# Patient Record
Sex: FEMALE | Race: WHITE
[De-identification: names, ages, dates, MRNs, and addresses within clinical notes are randomized per-mention and may not be internally consistent; named-entity substitution may affect disease eponyms.]

---

## 2019-10-09 ENCOUNTER — HOSPITAL ENCOUNTER (INPATIENT)
Dept: HOSPITAL 61 - ER | Age: 74
LOS: 1 days | Discharge: HOME | DRG: 392 | End: 2019-10-10
Attending: FAMILY MEDICINE | Admitting: FAMILY MEDICINE
Payer: MEDICARE

## 2019-10-09 VITALS — WEIGHT: 277.44 LBS | HEIGHT: 65 IN | BODY MASS INDEX: 46.22 KG/M2

## 2019-10-09 VITALS — DIASTOLIC BLOOD PRESSURE: 95 MMHG | SYSTOLIC BLOOD PRESSURE: 121 MMHG

## 2019-10-09 VITALS — DIASTOLIC BLOOD PRESSURE: 76 MMHG | SYSTOLIC BLOOD PRESSURE: 156 MMHG

## 2019-10-09 VITALS — SYSTOLIC BLOOD PRESSURE: 120 MMHG | DIASTOLIC BLOOD PRESSURE: 59 MMHG

## 2019-10-09 DIAGNOSIS — E03.9: ICD-10-CM

## 2019-10-09 DIAGNOSIS — I87.2: ICD-10-CM

## 2019-10-09 DIAGNOSIS — I50.32: ICD-10-CM

## 2019-10-09 DIAGNOSIS — E11.9: ICD-10-CM

## 2019-10-09 DIAGNOSIS — I11.0: ICD-10-CM

## 2019-10-09 DIAGNOSIS — Z98.49: ICD-10-CM

## 2019-10-09 DIAGNOSIS — E88.81: ICD-10-CM

## 2019-10-09 DIAGNOSIS — Z96.653: ICD-10-CM

## 2019-10-09 DIAGNOSIS — M19.90: ICD-10-CM

## 2019-10-09 DIAGNOSIS — E66.01: ICD-10-CM

## 2019-10-09 DIAGNOSIS — L97.329: ICD-10-CM

## 2019-10-09 DIAGNOSIS — I45.10: ICD-10-CM

## 2019-10-09 DIAGNOSIS — K21.9: Primary | ICD-10-CM

## 2019-10-09 DIAGNOSIS — Z82.49: ICD-10-CM

## 2019-10-09 DIAGNOSIS — E78.5: ICD-10-CM

## 2019-10-09 LAB
ALBUMIN SERPL-MCNC: 4.1 G/DL (ref 3.4–5)
ALBUMIN/GLOB SERPL: 1.2 {RATIO} (ref 1–1.7)
ALP SERPL-CCNC: 97 U/L (ref 46–116)
ALT SERPL-CCNC: 25 U/L (ref 14–59)
ANION GAP SERPL CALC-SCNC: 8 MMOL/L (ref 6–14)
AST SERPL-CCNC: 26 U/L (ref 15–37)
BASOPHILS # BLD AUTO: 0.1 X10^3/UL (ref 0–0.2)
BASOPHILS NFR BLD: 1 % (ref 0–3)
BILIRUB SERPL-MCNC: 0.4 MG/DL (ref 0.2–1)
BUN SERPL-MCNC: 20 MG/DL (ref 7–20)
BUN/CREAT SERPL: 25 (ref 6–20)
CALCIUM SERPL-MCNC: 9.8 MG/DL (ref 8.5–10.1)
CHLORIDE SERPL-SCNC: 102 MMOL/L (ref 98–107)
CK SERPL-CCNC: 92 U/L (ref 26–192)
CO2 SERPL-SCNC: 32 MMOL/L (ref 21–32)
CREAT SERPL-MCNC: 0.8 MG/DL (ref 0.6–1)
EOSINOPHIL NFR BLD: 0.1 X10^3/UL (ref 0–0.7)
EOSINOPHIL NFR BLD: 1 % (ref 0–3)
ERYTHROCYTE [DISTWIDTH] IN BLOOD BY AUTOMATED COUNT: 14.2 % (ref 11.5–14.5)
GFR SERPLBLD BASED ON 1.73 SQ M-ARVRAT: 69.9 ML/MIN
GLOBULIN SER-MCNC: 3.5 G/DL (ref 2.2–3.8)
GLUCOSE SERPL-MCNC: 106 MG/DL (ref 70–99)
HCT VFR BLD CALC: 44.5 % (ref 36–47)
HGB BLD-MCNC: 14.9 G/DL (ref 12–15.5)
LIPASE: 233 U/L (ref 73–393)
LYMPHOCYTES # BLD: 1.8 X10^3/UL (ref 1–4.8)
LYMPHOCYTES NFR BLD AUTO: 34 % (ref 24–48)
MAGNESIUM SERPL-MCNC: 1.8 MG/DL (ref 1.8–2.4)
MCH RBC QN AUTO: 31 PG (ref 25–35)
MCHC RBC AUTO-ENTMCNC: 33 G/DL (ref 31–37)
MCV RBC AUTO: 92 FL (ref 79–100)
MONO #: 0.5 X10^3/UL (ref 0–1.1)
MONOCYTES NFR BLD: 10 % (ref 0–9)
NEUT #: 2.8 X10^3/UL (ref 1.8–7.7)
NEUTROPHILS NFR BLD AUTO: 53 % (ref 31–73)
PLATELET # BLD AUTO: 199 X10^3/UL (ref 140–400)
POTASSIUM SERPL-SCNC: 3.7 MMOL/L (ref 3.5–5.1)
PROT SERPL-MCNC: 7.6 G/DL (ref 6.4–8.2)
PROTHROMBIN TIME: 12.6 SEC (ref 11.7–14)
RBC # BLD AUTO: 4.85 X10^6/UL (ref 3.5–5.4)
SODIUM SERPL-SCNC: 142 MMOL/L (ref 136–145)
WBC # BLD AUTO: 5.2 X10^3/UL (ref 4–11)

## 2019-10-09 PROCEDURE — 82550 ASSAY OF CK (CPK): CPT

## 2019-10-09 PROCEDURE — 83690 ASSAY OF LIPASE: CPT

## 2019-10-09 PROCEDURE — 83735 ASSAY OF MAGNESIUM: CPT

## 2019-10-09 PROCEDURE — 71045 X-RAY EXAM CHEST 1 VIEW: CPT

## 2019-10-09 PROCEDURE — 85610 PROTHROMBIN TIME: CPT

## 2019-10-09 PROCEDURE — 84484 ASSAY OF TROPONIN QUANT: CPT

## 2019-10-09 PROCEDURE — 93306 TTE W/DOPPLER COMPLETE: CPT

## 2019-10-09 PROCEDURE — 80053 COMPREHEN METABOLIC PANEL: CPT

## 2019-10-09 PROCEDURE — 85025 COMPLETE CBC W/AUTO DIFF WBC: CPT

## 2019-10-09 PROCEDURE — 93005 ELECTROCARDIOGRAM TRACING: CPT

## 2019-10-09 PROCEDURE — 84443 ASSAY THYROID STIM HORMONE: CPT

## 2019-10-09 PROCEDURE — 36415 COLL VENOUS BLD VENIPUNCTURE: CPT

## 2019-10-09 PROCEDURE — G0378 HOSPITAL OBSERVATION PER HR: HCPCS

## 2019-10-09 PROCEDURE — 80061 LIPID PANEL: CPT

## 2019-10-09 PROCEDURE — 83880 ASSAY OF NATRIURETIC PEPTIDE: CPT

## 2019-10-09 RX ADMIN — ENOXAPARIN SODIUM SCH MG: 40 INJECTION SUBCUTANEOUS at 17:00

## 2019-10-09 NOTE — RAD
PORTABLE CHEST 1V 10/9/2019 2:42 PM

 

INDICATION: Chest pain

 

COMPARISON: 8/12/2016

 

TECHNIQUE: Portable frontal view of the chest is provided.

 

FINDINGS:

 

The cardiomediastinal silhouette is similar in appearance. Lungs are 

clear.

 

There are no significant pleural effusions. There is no pulmonary vascular

congestion. No pneumothorax.

 

IMPRESSION:

 

There is no acute cardiopulmonary process.

 

Electronically signed by: Angela Castorena MD (10/9/2019 3:12 PM) 

[facility]

## 2019-10-09 NOTE — PDOC1
History and Physical


Date of Admission


Date of Admission


DATE: 10/9/19 


TIME: 15:44





Identification/Chief Complaint


Chief Complaint


seen in ER WITH EPISODE OF CHEST TIGHTNESS THIS AM WHILE DRIVING with 





Past Medical History


Past Medical History





Past Medical History


Cardiovascular:  HTN, Hyperlipidemia


Pulmonary:  No pertinent hx


GI:  No pertinent hx


Heme/Onc:  No pertinent hx


Hepatobiliary:  No pertinent hx


Psych:  No pertinent hx


Musculoskeletal:  Osteoarthritis


Rheumatologic:  No pertinent hx


Infectious disease:  No pertinent hx


ENT:  No pertinent hx


Renal/:  No pertinent hx


Endocrine:  Diabetes, Hypothyroidism


Dermatology:  No pertinent hx





Past Surgical History


Past Surgical History:  Total knee replacement, Other (Left lower extremity 

revascularization)





Family History


Family History:  No Significant





Social History


Smoke:  No


ALCOHOL:  NIGHTLY ONE COCTAIL


Drugs:  None


Cardiovascular:  HTN, Hyperlipidemia


Pulmonary:  No pertinent hx


GI:  No pertinent hx


Heme/Onc:  No pertinent hx


Hepatobiliary:  No pertinent hx


Psych:  No pertinent hx


Musculoskeletal:  Osteoarthritis


Rheumatologic:  No pertinent hx


Infectious disease:  No pertinent hx


Renal/:  No pertinent hx


Endocrine:  Diabetes, Hypothyroidism





Past Surgical History


Past Surgical History:  Total knee replacement, Other





Family History


Family History:  No Significant, High Cholestrol, Hypertension





Social History


ALCOHOL:  none


Drugs:  None





Current Medications


Current Medications





Current Medications


Aspirin (Children'S Aspirin) 324 mg 1X  ONCE PO  Last administered on 10/9/19at 

14:53;  Start 10/9/19 at 14:15;  Stop 10/9/19 at 14:18;  Status DC


Nitroglycerin (Nitrostat) 0.4 mg PRN Q5MIN  PRN SL CP RATING > 1/10 Last 

administered on 10/9/19at 14:55;  Start 10/9/19 at 14:15;  Stop 10/10/19 at 

14:14





Active Scripts


Active


Reported


Lisinopril 40 Mg Tablet 40 Mg PO DAILY


Metformin Hcl 500 Mg Tablet 500 Mg PO DAILY


Levothyroxine Sodium 50 Mcg Tablet 50 Mcg PO DAILY


Lovastatin 10 Mg Tablet 10 Mg PO DAILY


Meloxicam 7.5 Mg Tablet 7.5 Mg PO DAILY





Allergies


Allergies:  


Coded Allergies:  


     No Known Drug Allergies (Unverified , 9/6/15)





ROS


Review of System


14 pt ros otherwise nl


General:  YES: Fatigue


PSYCHOLOGICAL ROS:  No: Anxiety, Behavioral Disorder, Concentration difficultie,

Decreased libido, Depression, Disorientation, Hallucinations, Hostility, 

Irritablity, Memory difficulties, Mood Swings, Obsessive thoughts, Physical 

abuse, Sexual abuse, Sleep disturbances, Suicidal ideation, Other


ALLERGY AND IMMUNOLOGY:  No: Hives, Insect Bite Sensitivity, Itchy/Watery Eyes, 

Nasal Congestion, Post Nasal Drip, Seasonal Allergies, Other


Hematological and Lymphatic:  No: Bleeding Problems, Blood Clots, Blood 

Transfusions, Brusing, Night Sweats, Pallor, Swollen Lymph Nodes, Other


Cardiovascular:  yes Chest Pain


Gastrointestinal:  No Nausea, No Vomiting, No Abdominal Pain, No Diarrhea, No 

Constipation, No Melena, No Hematochezia, No Other


Musculoskeletal:  Yes Joint Stiffness; 


   No Gait Disturbance, No Joint Pain, No Joint Swelling, No Muscle Pain, No 

Muscular Weakness, No Pain In:, No Swelling In:, No Other


Neurological:  No Behavorial Changes, No Bowel/Bladder ControlChng, No 

Confusion, No Dizziness, No Gait Disturbance, No Headaches, No Impaired 

Coord/balance, No Memory Loss, No Numbness/Tingling, No Seizures, No Speech 

Problems, No Tremors, No Visual Changes, No Weakness, No Other





Physical Exam


General:  Alert, Oriented X3, Cooperative, No acute distress


HEENT:  PERRLA


Lungs:  Clear to auscultation, Normal air movement


Heart:  RRR


Breasts:  Not examined


Abdomen:  Normal bowel sounds, Soft


Rectal Exam:  not examined


Extremities:  No cyanosis


Neuro:  Normal speech, Cranial nerves 3-12 NL


Psych/Mental Status:  Mental status NL, Mood NL





Vitals


Vitals





Vital Signs








  Date Time  Temp Pulse Resp B/P (MAP) Pulse Ox O2 Delivery O2 Flow Rate FiO2


 


10/9/19 14:55  83  138/84    


 


10/9/19 13:55 98.1  18  96 Room Air  





 98.1       











Labs


Labs





Laboratory Tests








Test


 10/9/19


14:30


 


White Blood Count


 5.2 x10^3/uL


(4.0-11.0)


 


Red Blood Count


 4.85 x10^6/uL


(3.50-5.40)


 


Hemoglobin


 14.9 g/dL


(12.0-15.5)


 


Hematocrit


 44.5 %


(36.0-47.0)


 


Mean Corpuscular Volume 92 fL () 


 


Mean Corpuscular Hemoglobin 31 pg (25-35) 


 


Mean Corpuscular Hemoglobin


Concent 33 g/dL


(31-37)


 


Red Cell Distribution Width


 14.2 %


(11.5-14.5)


 


Platelet Count


 199 x10^3/uL


(140-400)


 


Neutrophils (%) (Auto) 53 % (31-73) 


 


Lymphocytes (%) (Auto) 34 % (24-48) 


 


Monocytes (%) (Auto) 10 % (0-9) 


 


Eosinophils (%) (Auto) 1 % (0-3) 


 


Basophils (%) (Auto) 1 % (0-3) 


 


Neutrophils # (Auto)


 2.8 x10^3/uL


(1.8-7.7)


 


Lymphocytes # (Auto)


 1.8 x10^3/uL


(1.0-4.8)


 


Monocytes # (Auto)


 0.5 x10^3/uL


(0.0-1.1)


 


Eosinophils # (Auto)


 0.1 x10^3/uL


(0.0-0.7)


 


Basophils # (Auto)


 0.1 x10^3/uL


(0.0-0.2)


 


Prothrombin Time


 12.6 SEC


(11.7-14.0)


 


Prothromb Time International


Ratio 1.0 (0.8-1.1) 





 


Sodium Level


 142 mmol/L


(136-145)


 


Potassium Level


 3.7 mmol/L


(3.5-5.1)


 


Chloride Level


 102 mmol/L


()


 


Carbon Dioxide Level


 32 mmol/L


(21-32)


 


Anion Gap 8 (6-14) 


 


Blood Urea Nitrogen


 20 mg/dL


(7-20)


 


Creatinine


 0.8 mg/dL


(0.6-1.0)


 


Estimated GFR


(Cockcroft-Gault) 69.9 





 


BUN/Creatinine Ratio 25 (6-20) 


 


Glucose Level


 106 mg/dL


(70-99)


 


Calcium Level


 9.8 mg/dL


(8.5-10.1)


 


Magnesium Level


 1.8 mg/dL


(1.8-2.4)


 


Total Bilirubin


 0.4 mg/dL


(0.2-1.0)


 


Aspartate Amino Transf


(AST/SGOT) 26 U/L (15-37) 





 


Alanine Aminotransferase


(ALT/SGPT) 25 U/L (14-59) 





 


Alkaline Phosphatase


 97 U/L


()


 


Creatine Kinase


 92 U/L


()


 


Troponin I Quantitative


 < 0.017 ng/mL


(0.000-0.055)


 


NT-Pro-B-Type Natriuretic


Peptide 50 pg/mL


(0-449)


 


Total Protein


 7.6 g/dL


(6.4-8.2)


 


Albumin


 4.1 g/dL


(3.4-5.0)


 


Albumin/Globulin Ratio 1.2 (1.0-1.7) 


 


Lipase


 233 U/L


()








Laboratory Tests








Test


 10/9/19


14:30


 


White Blood Count


 5.2 x10^3/uL


(4.0-11.0)


 


Red Blood Count


 4.85 x10^6/uL


(3.50-5.40)


 


Hemoglobin


 14.9 g/dL


(12.0-15.5)


 


Hematocrit


 44.5 %


(36.0-47.0)


 


Mean Corpuscular Volume 92 fL () 


 


Mean Corpuscular Hemoglobin 31 pg (25-35) 


 


Mean Corpuscular Hemoglobin


Concent 33 g/dL


(31-37)


 


Red Cell Distribution Width


 14.2 %


(11.5-14.5)


 


Platelet Count


 199 x10^3/uL


(140-400)


 


Neutrophils (%) (Auto) 53 % (31-73) 


 


Lymphocytes (%) (Auto) 34 % (24-48) 


 


Monocytes (%) (Auto) 10 % (0-9) 


 


Eosinophils (%) (Auto) 1 % (0-3) 


 


Basophils (%) (Auto) 1 % (0-3) 


 


Neutrophils # (Auto)


 2.8 x10^3/uL


(1.8-7.7)


 


Lymphocytes # (Auto)


 1.8 x10^3/uL


(1.0-4.8)


 


Monocytes # (Auto)


 0.5 x10^3/uL


(0.0-1.1)


 


Eosinophils # (Auto)


 0.1 x10^3/uL


(0.0-0.7)


 


Basophils # (Auto)


 0.1 x10^3/uL


(0.0-0.2)


 


Prothrombin Time


 12.6 SEC


(11.7-14.0)


 


Prothromb Time International


Ratio 1.0 (0.8-1.1) 





 


Sodium Level


 142 mmol/L


(136-145)


 


Potassium Level


 3.7 mmol/L


(3.5-5.1)


 


Chloride Level


 102 mmol/L


()


 


Carbon Dioxide Level


 32 mmol/L


(21-32)


 


Anion Gap 8 (6-14) 


 


Blood Urea Nitrogen


 20 mg/dL


(7-20)


 


Creatinine


 0.8 mg/dL


(0.6-1.0)


 


Estimated GFR


(Cockcroft-Gault) 69.9 





 


BUN/Creatinine Ratio 25 (6-20) 


 


Glucose Level


 106 mg/dL


(70-99)


 


Calcium Level


 9.8 mg/dL


(8.5-10.1)


 


Magnesium Level


 1.8 mg/dL


(1.8-2.4)


 


Total Bilirubin


 0.4 mg/dL


(0.2-1.0)


 


Aspartate Amino Transf


(AST/SGOT) 26 U/L (15-37) 





 


Alanine Aminotransferase


(ALT/SGPT) 25 U/L (14-59) 





 


Alkaline Phosphatase


 97 U/L


()


 


Creatine Kinase


 92 U/L


()


 


Troponin I Quantitative


 < 0.017 ng/mL


(0.000-0.055)


 


NT-Pro-B-Type Natriuretic


Peptide 50 pg/mL


(0-449)


 


Total Protein


 7.6 g/dL


(6.4-8.2)


 


Albumin


 4.1 g/dL


(3.4-5.0)


 


Albumin/Globulin Ratio 1.2 (1.0-1.7) 


 


Lipase


 233 U/L


()











Images


Images


T


PATIENT: VARINDER CABRERA  ACCOUNT: [id number]     MRN#: [id number]


: 1945           LOCATION: ER              AGE: 73


SEX: F                    EXAM DT: 10/09/19         ACCESSION#: [id number]


STATUS: REG ER            ORD. PHYSICIAN: CHONG SAMUELS MD


REASON: chest pain


PROCEDURE: PORTABLE CHEST 1V





PORTABLE CHEST 1V 10/9/2019 2:42 PM


 


INDICATION: Chest pain


 


COMPARISON: 2016


 


TECHNIQUE: Portable frontal view of the chest is provided.


 


FINDINGS:


 


The cardiomediastinal silhouette is similar in appearance. Lungs are 


clear.


 


There are no significant pleural effusions. There is no pulmonary vascular


congestion. No pneumothorax.


 


IMPRESSION:


 


There is no acute cardiopulmonary process.


 


Electronically signed by: Kristal Dawkins MD (10/9/2019 3:12 PM) 


[facility]














DICTATED and SIGNED BY:     KRISTAL DAWKINS MD


DATE:     10/09/19 9341




















ricuspid Valve


TR P. Velocity   261cm/s   RAP ESTIMATE   3mmHg


TR Peak Gr.   27mmHg   RVSP      30mmHg





Pulmonary Vein


S1 Velocity   50.5cm/s   D2 Velocity   47.3cm/s





 LEFT VENTRICLE 


The left ventricle is normal size. There is mild concentric left ventricular 

hypertrophy. The left ventricular systolic function is normal and the ejection 

fraction is within normal range. The Ejection Fraction is 65%. Transmitral Do

ppler flow pattern is Grade I-abnormal relaxation pattern.





 RIGHT VENTRICLE 


The right ventricle is normal size. The right ventricular systolic function is 

normal.





 ATRIA 


The left atrium size is normal. The right atrium size is normal. The interatrial

septum is intact with no evidence for an atrial septal defect or patent foramen 

ovale as noted on 2-D or Doppler imaging.





 AORTIC VALVE 


The aortic valve is normal in structure and function. Doppler and Color Flow 

revealed no significant aortic regurgitation. There is no significant aortic 

valvular stenosis.





 MITRAL VALVE 


Mitral annular calcification is mild. There is no mitral valve stenosis. Doppler

and Color Flow revealed no mitral valve regurgitation noted.





 TRICUSPID VALVE 


The tricuspid valve is normal in structure. Doppler and Color Flow revealed 

trace tricuspid regurgitation. The PA pressure was estimated at 30 mmHg. There 

is no tricuspid valve stenosis.





 PULMONIC VALVE 


The pulmonic valve is not well visualized. Doppler and Color Flow revealed no 

pulmonic valvular regurgitation. There is no pulmonic valvular stenosis.





 GREAT VESSELS 


The aortic root is normal in size. Normal pulmonary venous flow (Doppler). The 

IVC is normal in size and collapses >50% with inspiration.





 PERICARDIAL EFFUSION 


There is no evidence of significant pericardial effusion.





Critical Notification


Critical Value: No





<Conclusion>


The left ventricular systolic function is normal and the ejection fraction is 

within normal range.


The Ejection Fraction is 65%.


There is mild concentric left ventricular hypertrophy.


Transmitral Doppler flow pattern is Grade I-abnormal relaxation pattern.


The left atrium size is normal.


The right atrium size is normal.


The aortic valve is normal in structure and function.


Mitral annular calcification is mild.


Doppler and Color Flow revealed no mitral valve regurgitation noted.


Doppler and Color Flow revealed trace tricuspid regurgitation.


The PA pressure was estimated at 30 mmHg.


The pulmonic valve is not well visualized.


There is no evidence of significant pericardial effusion.














DICTATED and SIGNED BY:     ISH CARDOSO MD


DATE:     16 6925





CC: ANDI HUTCHINS MD; ISH CARDOSO MD ~





VTE Prophylaxis Ordered


VTE Prophylaxis Devices:  Yes


VTE Pharmacological Prophylaxi:  No





Assessment/Plan


Assessment/Plan


IMPRESSION





1. CHEST Discomfort


2. morbid obesity


3. hyperlipidemia


4. metabolic syndrome


5. hx diastolic CHF by previous echo 


6. hypothyroid state on replacement rx











plan








admit


serial troponin i


echo


cardiology consult


cvc bed


dvt prophylaxis





54 min pt exam, chart review, > 50% of time spent with exam, chart review, pt 

care coordination











MELISSA HAYES MD           Oct 9, 2019 15:44

## 2019-10-09 NOTE — NUR
lipitor refused d/t no statin therapy at home, no known cholesterol issues.  Pt to 
reevaluate with PCP

## 2019-10-09 NOTE — EKG
[facility]

              [address] [place] [address]

Test Date:    2019-10-09               Test Time:    13:58:34

Pat Name:     VARINDER CABRERA            Department:   

Patient ID:   [id number]           Room:         209 1

Gender:       F                        Technician:   

:          1945               Requested By: CHONG SAMUELS

Order Number: [id number]           Reading MD:   Aditya Huntley MD

                                 Measurements

Intervals                              Axis          

Rate:         100                      P:            -2

NJ:           160                      QRS:          -83

QRSD:         126                      T:            62

QT:           376                                    

QTc:          488                                    

                           Interpretive Statements

SINUS RHYTHM

ATRIAL PREMATURE COMPLEX(ES)

LAFB

RBBB

Electronically Signed On 10- 14:33:36 CDT by Aditya Huntley MD

## 2019-10-09 NOTE — NUR
The patient, VARINDER CABRERA, 74 y/o, F admitted by MELISSA HAYES MD, was given written 
information regarding hospital policies, unit procedures and contact persons.  



Valuables were checked and patient has wound to left posterior heel and refused to have 
pictures and wound dressing changed d/t debridement 10/8/19 would prefer to keep it as it is

## 2019-10-09 NOTE — PHYS DOC
Past Medical History


Past Medical History:  Hypertension, Other


Additional Past Medical Histor:  PRE DIABETES


Past Surgical History:  Knee Replacement, Other


Additional Past Surgical Histo:  RIGHT BIG TOE


Alcohol Use:  Occasionally


Drug Use:  None





Adult General


Chief Complaint


Chief Complaint:  CHEST PAIN





HPI


HPI





Patient is a 73  year old female who presents with complaining of chest pain. 

Patient states she developed aching substernal chest pain with shortness of 

breath while driving about 2 hours prior to arrival to ER as a constant pain and

rated her pain 6/10. Patient complaining of shortness of breath without 

palpitation, dizziness, focal neuro deficit, nausea and vomiting. Patient states

she has had episodes of nonexertional substernal chest pain for the last 10 days

that usually last for a short Time and today was the longest chest pain she had.





Review of Systems


Review of Systems





Constitutional: Denies fever or chills []


Eyes: Denies change in visual acuity, redness, or eye pain []


HENT: Denies nasal congestion or sore throat []


Respiratory: Denies cough, reports shortness of breath []


Cardiovascular: No additional information not addressed in HPI []


GI: Denies abdominal pain, nausea, vomiting, bloody stools or diarrhea []


: Denies dysuria or hematuria []


Musculoskeletal: Denies back pain or joint pain []


Integument: Denies rash or skin lesions []


Neurologic: Denies headache, focal weakness or sensory changes []


Endocrine: Denies polyuria or polydipsia []





All other systems were reviewed and found to be within normal limits, except as 

documented in this note.





Current Medications


Current Medications





Current Medications








 Medications


  (Trade)  Dose


 Ordered  Sig/[facility]  Start Time


 Stop Time Status Last Admin


Dose Admin


 


 Aspirin


  (Children'S


 Aspirin)  324 mg  1X  ONCE  10/9/19 14:15


 10/9/19 14:18 DC 10/9/19 14:53


324 MG


 


 Nitroglycerin


  (Nitrostat)  0.4 mg  PRN Q5MIN  PRN  10/9/19 14:15


 10/10/19 14:14  10/9/19 14:55


0.4 MG











Allergies


Allergies





Allergies








Coded Allergies Type Severity Reaction Last Updated Verified


 


  No Known Drug Allergies    9/6/15 No











Physical Exam


Physical Exam








Constitutional: Well developed, well nourished, mild distress, non-toxic 

appearance. []


HENT: Normocephalic, atraumatic.


Eyes: PERRLA, EOMI, conjunctiva normal, no discharge. [] 


Neck: Normal range of motion, no tenderness, supple, no stridor. [] 


Cardiovascular:Heart rate regular rhythm, no murmur []


Lungs & Thorax:  Bilateral breath sounds clear to auscultation []


Abdomen: Bowel sounds normal, soft, no tenderness, no masses, no pulsatile 

masses. [] 


Skin: Warm, dry, no erythema, no rash. [] 


Back: No tenderness, no CVA tenderness. [] 


Extremities: No tenderness, no cyanosis, no clubbing, ROM intact, no edema. [] 


Neurologic: Alert and oriented X 3, no focal deficits noted. []


Psychologic: Affect normal, judgement normal, mood normal. []





Current Patient Data


Vital Signs





                                   Vital Signs








  Date Time  Temp Pulse Resp B/P (MAP) Pulse Ox O2 Delivery O2 Flow Rate FiO2


 


10/9/19 14:55  83  138/84    


 


10/9/19 13:55 98.1  18  96 Room Air  





 98.1       








Lab Values





                                Laboratory Tests








Test


 10/9/19


14:30


 


White Blood Count


 5.2 x10^3/uL


(4.0-11.0)


 


Red Blood Count


 4.85 x10^6/uL


(3.50-5.40)


 


Hemoglobin


 14.9 g/dL


(12.0-15.5)


 


Hematocrit


 44.5 %


(36.0-47.0)


 


Mean Corpuscular Volume


 92 fL ()





 


Mean Corpuscular Hemoglobin 31 pg (25-35)  


 


Mean Corpuscular Hemoglobin


Concent 33 g/dL


(31-37)


 


Red Cell Distribution Width


 14.2 %


(11.5-14.5)


 


Platelet Count


 199 x10^3/uL


(140-400)


 


Neutrophils (%) (Auto) 53 % (31-73)  


 


Lymphocytes (%) (Auto) 34 % (24-48)  


 


Monocytes (%) (Auto) 10 % (0-9)  H


 


Eosinophils (%) (Auto) 1 % (0-3)  


 


Basophils (%) (Auto) 1 % (0-3)  


 


Neutrophils # (Auto)


 2.8 x10^3/uL


(1.8-7.7)


 


Lymphocytes # (Auto)


 1.8 x10^3/uL


(1.0-4.8)


 


Monocytes # (Auto)


 0.5 x10^3/uL


(0.0-1.1)


 


Eosinophils # (Auto)


 0.1 x10^3/uL


(0.0-0.7)


 


Basophils # (Auto)


 0.1 x10^3/uL


(0.0-0.2)


 


Prothrombin Time


 12.6 SEC


(11.7-14.0)


 


Prothrombin Time INR 1.0 (0.8-1.1)  


 


Sodium Level


 142 mmol/L


(136-145)


 


Potassium Level


 3.7 mmol/L


(3.5-5.1)


 


Chloride Level


 102 mmol/L


()


 


Carbon Dioxide Level


 32 mmol/L


(21-32)


 


Anion Gap 8 (6-14)  


 


Blood Urea Nitrogen


 20 mg/dL


(7-20)


 


Creatinine


 0.8 mg/dL


(0.6-1.0)


 


Estimated GFR


(Cockcroft-Gault) 69.9  





 


BUN/Creatinine Ratio 25 (6-20)  H


 


Glucose Level


 106 mg/dL


(70-99)  H


 


Calcium Level


 9.8 mg/dL


(8.5-10.1)


 


Magnesium Level


 1.8 mg/dL


(1.8-2.4)


 


Total Bilirubin


 0.4 mg/dL


(0.2-1.0)


 


Aspartate Amino Transferase


(AST) 26 U/L (15-37)





 


Alanine Aminotransferase (ALT)


 25 U/L (14-59)





 


Alkaline Phosphatase


 97 U/L


()


 


Creatine Kinase


 92 U/L


()


 


Troponin I Quantitative


 < 0.017 ng/mL


(0.000-0.055)


 


NT-Pro-B-Type Natriuretic


Peptide 50 pg/mL


(0-449)


 


Total Protein


 7.6 g/dL


(6.4-8.2)


 


Albumin


 4.1 g/dL


(3.4-5.0)


 


Albumin/Globulin Ratio 1.2 (1.0-1.7)  


 


Lipase


 233 U/L


()





                                Laboratory Tests


10/9/19 14:30








                                Laboratory Tests


10/9/19 14:30














EKG


EKG


 EKG interpreted by me. EKG at 1358 showed normal sinus rhythm at rate of 100, 

PVCs, left axis deviation, left anterior fascicular block, nonspecific 

intraventricular block, no acute ST and T-wave abnormalities.





Radiology/Procedures


Radiology/Procedures


[][facility]


                    [address] [place] [address]


                                 [phone]


                                        


                                 IMAGING REPORT





                                     Signed





PATIENT: VARINDER CABRERA  ACCOUNT: [id number]     MRN#: [id number]


: 1945           LOCATION: ER              AGE: 73


SEX: F                    EXAM DT: 10/09/19         ACCESSION#: [id number]


STATUS: REG ER            ORD. PHYSICIAN: CHONG SAMUELS MD


REASON: chest pain


PROCEDURE: PORTABLE CHEST 1V





PORTABLE CHEST 1V 10/9/2019 2:42 PM


 


INDICATION: Chest pain


 


COMPARISON: 2016


 


TECHNIQUE: Portable frontal view of the chest is provided.


 


FINDINGS:


 


The cardiomediastinal silhouette is similar in appearance. Lungs are 


clear.


 


There are no significant pleural effusions. There is no pulmonary vascular


congestion. No pneumothorax.


 


IMPRESSION:


 


There is no acute cardiopulmonary process.


 


Electronically signed by: Kristal Dawkins MD (10/9/2019 3:12 PM) 


[facility]














DICTATED and SIGNED BY:     KRISTAL DAWKINS MD


DATE:     10/09/19 1512





Course & Med Decision Making


Course & Med Decision Making


Pertinent Labs and Imaging studies reviewed. (See chart for details)





Evaluation of patient in ER showed 73-year-old female patient with heart score 

of 5 with complaining of chest pain for 12 hours and episodes of chest pain for 

short time for the last 10 days. Patient had unremarkable physical exam, EKG and

 labs. Patient treated with nitroglycerin with improvement of chest pain.Patient

 requiring admission for further evaluation and treatment. Discussed with Dr. Baker who is in agreement with admission. Discussed findings and plan with 

patient and family, who acknowledge understanding and agreement.





Dragon Disclaimer


Dragon Disclaimer


This electronic medical record was generated, in whole or in part, using a voice

 recognition dictation system.





Departure


Departure


Impression:  


   Primary Impression:  


   Acute chest pain


Disposition:   ADMITTED AS INPATIENT (at 1519)


Admitting Physician:  EVERETTE (Dr Baker )


Condition:  IMPROVED


Referrals:  


MELISSA MARQUEZ (PCP)





The HEART Score for CP Pts


HEART Score for Chest Pain:  








HEART Score for Chest Pain Response (Comments) Value


 


History Moderately Suspicious 1


 


ECG Nonspecific Repolarizatio 1


 


Age > 65 2


 


Risk Factors                            1 or 2 Risk Factors 1


 


Troponin < Normal Limit 0


 


Total  5








Risk Factors:


Risk Factors:  DM, Current or recent (<one month) smoker, HTN, HLP, family 

history of CAD, obesity.


Risk Scores:


Score 0 - 3:  2.5% MACE over next 6 weeks - Discharge Home


Score 4 - 6:  20.3% MACE over next 6 weeks - Admit for Clinical Observation


Score 7 - 10:  72.7% MACE over next 6 weeks - Early Invasive Strategies











CHONG SAMUELS MD              Oct 9, 2019 15:56

## 2019-10-10 VITALS — DIASTOLIC BLOOD PRESSURE: 67 MMHG | SYSTOLIC BLOOD PRESSURE: 127 MMHG

## 2019-10-10 VITALS — SYSTOLIC BLOOD PRESSURE: 132 MMHG | DIASTOLIC BLOOD PRESSURE: 72 MMHG

## 2019-10-10 VITALS — DIASTOLIC BLOOD PRESSURE: 61 MMHG | SYSTOLIC BLOOD PRESSURE: 130 MMHG

## 2019-10-10 VITALS — DIASTOLIC BLOOD PRESSURE: 76 MMHG | SYSTOLIC BLOOD PRESSURE: 133 MMHG

## 2019-10-10 LAB
CHOLEST SERPL-MCNC: 173 MG/DL (ref 0–200)
CHOLEST/HDLC SERPL: 2.6 {RATIO}
HDLC SERPL-MCNC: 66 MG/DL (ref 40–60)
LDLC: 90 MG/DL (ref 0–100)
TRIGL SERPL-MCNC: 84 MG/DL (ref 0–150)
VLDLC: 17 MG/DL (ref 0–40)

## 2019-10-10 RX ADMIN — ENOXAPARIN SODIUM SCH MG: 40 INJECTION SUBCUTANEOUS at 09:22

## 2019-10-10 NOTE — PDOC2
SYLVIA MORA APRN 10/10/19 0856:


CARDIAC CONSULT


DATE OF CONSULT


Date of Consult


DATE: 10/10/19 


TIME: 08:28





REASON FOR CONSULT


Reason for Consult:


Chest pain





REFERRING PHYSICIAN


Referring Physician:


Fullbright





SOURCE


Source:  Chart review, Patient





HISTORY OF PRESENT ILLNESS


HISTORY OF PRESENT ILLNESS


This is a pleasant 74 yo female admitted for complains of chest pain.  Reports 

that in the last 2 weeks she has been having this midchest nonradiating achy 

feeling.  Reports that eventually yesterday she had this discomfort at noon that

lasted about 1.5 hours.  This was achy and at times felt that she could not take

a breath enough.  No nausea or vomiting. No SOA. No palpitations.  Felt fluttery

feeling as well but checked her HR and it was in the 60s.  No exertional CP. No 

persistent fatigue. She does not exercise and there has been no changes to her 

activity tolerance.  Denies any recent falls or injury. No fever or chills. No 

intractable coughing.  Denies any heartburn or past PUD. No CAD or any recent 

stress test. No hx of VTE. She was taken off statin before. Denies any leg pain 

with ambulation and no paresthesia.





PAST MEDICAL HISTORY


Cardiovascular:  HTN, Other (venous insufficiency)


Pulmonary:  No pertinent hx


CENTRAL NERVOUS SYSTEM:  Other (No pertinent history)


Heme/Onc:  No pertinent hx


Hepatobiliary:  No pertinent hx


Psych:  No pertinent hx


Musculoskeletal:  Osteoarthritis


Infectious disease:  No pertinent hx


ENT:  Allergic Rhinitis, Other (cataract)


Renal/:  No pertinent hx


Endocrine:  Diabetes (borderline), Hypothyroidism





PAST SURGICAL HISTORY


Past Surgical History:  Cataract Removal, Total knee replacement (bilateral), 

Tonsillectomy, Other (right big toe)





FAMILY HISTORY


Family History


noncontributory





SOCIAL HISTORY


Smoke:  No


ALCOHOL:  none


Drugs:  None


Lives:  with Family





CURRENT MEDICATIONS


CURRENT MEDICATIONS





Current Medications








 Medications


  (Trade)  Dose


 Ordered  Sig/Yefri


 Route


 PRN Reason  Start Time


 Stop Time Status Last Admin


Dose Admin


 


 Aspirin


  (Children'S


 Aspirin)  324 mg  1X  ONCE


 PO


   10/9/19 14:15


 10/9/19 14:18 DC 10/9/19 14:53





 


 Nitroglycerin


  (Nitrostat)  0.4 mg  PRN Q5MIN  PRN


 SL


 CP RATING > 1/10  10/9/19 14:15


 10/10/19 14:14  10/9/19 14:55





 


 Levothyroxine


 Sodium


  (Synthroid)  50 mcg  DAILY06


 PO


   10/10/19 06:00


    10/10/19 06:39














ALLERGIES


ALLERGIES:  


Coded Allergies:  


     No Known Drug Allergies (Unverified , 9/6/15)





ROS


Review of System


14 point ROS evaluated with pertinent positives noted per HPI





PHYSICAL EXAM


General:  Alert, Oriented X3, Cooperative, No acute distress


HEENT:  Atraumatic, Mucous membr. moist/pink


Lungs:  Clear to auscultation, Normal air movement


Heart:  Regular rate (SR), Normal S1, Normal S2, No murmurs


Abdomen:  Soft, No tenderness


Extremities:  No cyanosis, No edema


Skin:  Other (left medial ankle ulcer)


Neuro:  Normal speech, Sensation intact


Psych/Mental Status:  Mental status NL, Mood NL


MUSCULOSKELETAL:  Osteoarthritic changes both hands





VITALS/I&O


VITALS/I&O:





                                   Vital Signs








  Date Time  Temp Pulse Resp B/P (MAP) Pulse Ox O2 Delivery O2 Flow Rate FiO2


 


10/10/19 03:00 98.5 79 20 127/67 (87) 94 Room Air  





 98.5       














                                    I & O   


 


 10/9/19 10/9/19 10/10/19





 15:00 23:00 07:00


 


Intake Total  100 ml 0 ml


 


Output Total  300 ml 


 


Balance  -200 ml 0 ml











LABS


Lab:





                                Laboratory Tests








Test


 10/9/19


14:30 10/9/19


20:15


 


White Blood Count


 5.2 x10^3/uL


(4.0-11.0) 





 


Red Blood Count


 4.85 x10^6/uL


(3.50-5.40) 





 


Hemoglobin


 14.9 g/dL


(12.0-15.5) 





 


Hematocrit


 44.5 %


(36.0-47.0) 





 


Mean Corpuscular Volume


 92 fL ()


 





 


Mean Corpuscular Hemoglobin 31 pg (25-35)   


 


Mean Corpuscular Hemoglobin


Concent 33 g/dL


(31-37) 





 


Red Cell Distribution Width


 14.2 %


(11.5-14.5) 





 


Platelet Count


 199 x10^3/uL


(140-400) 





 


Neutrophils (%) (Auto) 53 % (31-73)   


 


Lymphocytes (%) (Auto) 34 % (24-48)   


 


Monocytes (%) (Auto) 10 % (0-9)  H 


 


Eosinophils (%) (Auto) 1 % (0-3)   


 


Basophils (%) (Auto) 1 % (0-3)   


 


Neutrophils # (Auto)


 2.8 x10^3/uL


(1.8-7.7) 





 


Lymphocytes # (Auto)


 1.8 x10^3/uL


(1.0-4.8) 





 


Monocytes # (Auto)


 0.5 x10^3/uL


(0.0-1.1) 





 


Eosinophils # (Auto)


 0.1 x10^3/uL


(0.0-0.7) 





 


Basophils # (Auto)


 0.1 x10^3/uL


(0.0-0.2) 





 


Prothrombin Time


 12.6 SEC


(11.7-14.0) 





 


Prothrombin Time INR 1.0 (0.8-1.1)   


 


Sodium Level


 142 mmol/L


(136-145) 





 


Potassium Level


 3.7 mmol/L


(3.5-5.1) 





 


Chloride Level


 102 mmol/L


() 





 


Carbon Dioxide Level


 32 mmol/L


(21-32) 





 


Anion Gap 8 (6-14)   


 


Blood Urea Nitrogen


 20 mg/dL


(7-20) 





 


Creatinine


 0.8 mg/dL


(0.6-1.0) 





 


Estimated GFR


(Cockcroft-Gault) 69.9  


 





 


BUN/Creatinine Ratio 25 (6-20)  H 


 


Glucose Level


 106 mg/dL


(70-99)  H 





 


Calcium Level


 9.8 mg/dL


(8.5-10.1) 





 


Magnesium Level


 1.8 mg/dL


(1.8-2.4) 





 


Total Bilirubin


 0.4 mg/dL


(0.2-1.0) 





 


Aspartate Amino Transferase


(AST) 26 U/L (15-37)


 





 


Alanine Aminotransferase (ALT)


 25 U/L (14-59)


 





 


Alkaline Phosphatase


 97 U/L


() 





 


Creatine Kinase


 92 U/L


() 





 


Troponin I Quantitative


 < 0.017 ng/mL


(0.000-0.055) < 0.017 ng/mL


(0.000-0.055)


 


NT-Pro-B-Type Natriuretic


Peptide 50 pg/mL


(0-449) 





 


Total Protein


 7.6 g/dL


(6.4-8.2) 





 


Albumin


 4.1 g/dL


(3.4-5.0) 





 


Albumin/Globulin Ratio 1.2 (1.0-1.7)   


 


Lipase


 233 U/L


() 








                                Laboratory Tests


10/9/19 14:30








                                Laboratory Tests


10/9/19 14:30











ECHOCARDIOGRAM


ECHOCARDIOGRAM





<Conclusion>


The left ventricular systolic function is normal and the ejection fraction is 

within normal range.


The Ejection Fraction is 65%.


There is mild concentric left ventricular hypertrophy.


Transmitral Doppler flow pattern is Grade I-abnormal relaxation pattern.


The left atrium size is normal.


The right atrium size is normal.


The aortic valve is normal in structure and function.


Mitral annular calcification is mild.


Doppler and Color Flow revealed no mitral valve regurgitation noted.


Doppler and Color Flow revealed trace tricuspid regurgitation.


The PA pressure was estimated at 30 mmHg.


The pulmonic valve is not well visualized.


There is no evidence of significant pericardial effusion.





DATE:     08/12/16 1851





ASSESSMENT/PLAN


ASSESSMENT/PLAN


1. Atypical chest pain: possibly GI


2. HTN: controlled


3. Hypothyroidism


4. Morbid obesity


5. Chronic RBBB


6. Left medial ankle venous stasis ulcer: sees Dr. Booker closely and had 

recent leg sono 6/2019 and reported no significant arterial disease. 


7. Metabolic syndrome





Recommendations


1. TTE, lipids, TSH


2. Start on PPI


3. Wound care consult


4. Will consider for outpt stress test pending TTE. F/U in office


5. Discussed CAD symptoms and arrhythmia.





GELY ENAMORADO MD 10/10/19 2202:


CARDIAC CONSULT


ASSESSMENT/PLAN


ASSESSMENT/PLAN


Pt. seen and examined. Agree with above NP note. 


73 y.o woman with multiple risk factors and atypical chest pain. 


Discussed various approaches to further evaluations, she prefers outpt stress 

testing and this is quite reasonable. Will arrange. 


Thanks. Ok to BERONICA from a CV standpoint.











SYLVIA MORA          Oct 10, 2019 08:56


GELY ENAMORADO MD       Oct 10, 2019 22:02

## 2019-10-10 NOTE — NUR
Discharge Note:



VARINDER CABRERA [address] [place] [address]



Discharge instructions and discharge home medications reviewed with Patient and a copy 
given. All questions have been answered and understanding verbalized. 



The following instructions and handouts were given: CP and cardiac stress test



Discontinued IV line



Patient discharged to home with self care via wheelchair

## 2019-10-10 NOTE — NUR
SS following up with discharge planning. Discharge order on the chart for home with self 
care. Pt is from home with spouse and is currently on room air.

## 2019-10-10 NOTE — PDOC
[facility] PROGRESS NOTE


Chief Complaint


Chief Complaint


Acute chest pain





History of Present Illness


History of Present Illness


10/10/19


Pt seen and examined


Pt is feeling better today and was eating breakfast sitting up 


Pt states her chest pain has been on and off the past year and has slowly 

progressed 


We discussed that if her ECHO is clear today that she is ready to be discharged 

home she she agrees


EF = 65% from 2016 echo 


Charts and labs reviewed


DW RN





Vitals/I&O


Vitals/I&O:





                                   Vital Signs








  Date Time  Temp Pulse Resp B/P (MAP) Pulse Ox O2 Delivery O2 Flow Rate FiO2


 


10/10/19 09:21  69  130/61    


 


10/10/19 07:00 97.5  16  93 Room Air  





 97.5       














                                    I & O   


 


 10/9/19 10/9/19 10/10/19





 15:00 23:00 07:00


 


Intake Total  100 ml 0 ml


 


Output Total  300 ml 


 


Balance  -200 ml 0 ml











Physical Exam


General:  Alert, Oriented X3, Cooperative, No acute distress


Heart:  Regular rate (SR), Normal S1, Normal S2, No murmurs


Abdomen:  Soft, No tenderness


Extremities:  No cyanosis, No edema


Skin:  Other (left medial ankle ulcer)





Labs


Labs:





Laboratory Tests








Test


 10/9/19


14:30 10/9/19


20:15


 


White Blood Count


 5.2 x10^3/uL


(4.0-11.0) 





 


Red Blood Count


 4.85 x10^6/uL


(3.50-5.40) 





 


Hemoglobin


 14.9 g/dL


(12.0-15.5) 





 


Hematocrit


 44.5 %


(36.0-47.0) 





 


Mean Corpuscular Volume 92 fL ()  


 


Mean Corpuscular Hemoglobin 31 pg (25-35)  


 


Mean Corpuscular Hemoglobin


Concent 33 g/dL


(31-37) 





 


Red Cell Distribution Width


 14.2 %


(11.5-14.5) 





 


Platelet Count


 199 x10^3/uL


(140-400) 





 


Neutrophils (%) (Auto) 53 % (31-73)  


 


Lymphocytes (%) (Auto) 34 % (24-48)  


 


Monocytes (%) (Auto) 10 % (0-9)  


 


Eosinophils (%) (Auto) 1 % (0-3)  


 


Basophils (%) (Auto) 1 % (0-3)  


 


Neutrophils # (Auto)


 2.8 x10^3/uL


(1.8-7.7) 





 


Lymphocytes # (Auto)


 1.8 x10^3/uL


(1.0-4.8) 





 


Monocytes # (Auto)


 0.5 x10^3/uL


(0.0-1.1) 





 


Eosinophils # (Auto)


 0.1 x10^3/uL


(0.0-0.7) 





 


Basophils # (Auto)


 0.1 x10^3/uL


(0.0-0.2) 





 


Prothrombin Time


 12.6 SEC


(11.7-14.0) 





 


Prothromb Time International


Ratio 1.0 (0.8-1.1) 


 





 


Sodium Level


 142 mmol/L


(136-145) 





 


Potassium Level


 3.7 mmol/L


(3.5-5.1) 





 


Chloride Level


 102 mmol/L


() 





 


Carbon Dioxide Level


 32 mmol/L


(21-32) 





 


Anion Gap 8 (6-14)  


 


Blood Urea Nitrogen


 20 mg/dL


(7-20) 





 


Creatinine


 0.8 mg/dL


(0.6-1.0) 





 


Estimated GFR


(Cockcroft-Gault) 69.9 


 





 


BUN/Creatinine Ratio 25 (6-20)  


 


Glucose Level


 106 mg/dL


(70-99) 





 


Calcium Level


 9.8 mg/dL


(8.5-10.1) 





 


Magnesium Level


 1.8 mg/dL


(1.8-2.4) 





 


Total Bilirubin


 0.4 mg/dL


(0.2-1.0) 





 


Aspartate Amino Transf


(AST/SGOT) 26 U/L (15-37) 


 





 


Alanine Aminotransferase


(ALT/SGPT) 25 U/L (14-59) 


 





 


Alkaline Phosphatase


 97 U/L


() 





 


Creatine Kinase


 92 U/L


() 





 


Troponin I Quantitative


 < 0.017 ng/mL


(0.000-0.055) < 0.017 ng/mL


(0.000-0.055)


 


NT-Pro-B-Type Natriuretic


Peptide 50 pg/mL


(0-449) 





 


Total Protein


 7.6 g/dL


(6.4-8.2) 





 


Albumin


 4.1 g/dL


(3.4-5.0) 





 


Albumin/Globulin Ratio 1.2 (1.0-1.7)  


 


Lipase


 233 U/L


() 














Review of Systems


Review of Systems:


Denies weakness


Denies n/v/d





Assessment and Plan


Assessmemt and Plan


Problems


Medical Problems:


(1) Acute chest pain


Status: Acute  





Assessment


Chest pain


Obesity 


Hyperlipidemia


Metabolic syndrome


Diastolic CHF 


Hypothyroid state on replacement 





Plan


Cardiac monitoring


Serial enzymes and EKGs


Awaiting echo results 


Appreciate cardiology input 


DVT prophylaxis 


Full code





Comment


Review of Relevant


I have reviewed the following items kanu (where applicable) has been applied.


Medications:





Current Medications








 Medications


  (Trade)  Dose


 Ordered  Sig/Yefri


 Route


 PRN Reason  Start Time


 Stop Time Status Last Admin


Dose Admin


 


 Aspirin


  (Children'S


 Aspirin)  324 mg  1X  ONCE


 PO


   10/9/19 14:15


 10/9/19 14:18 DC 10/9/19 14:53





 


 Nitroglycerin


  (Nitrostat)  0.4 mg  PRN Q5MIN  PRN


 SL


 CP RATING > 1/10  10/9/19 14:15


 10/10/19 14:14  10/9/19 14:55





 


 Levothyroxine


 Sodium


  (Synthroid)  50 mcg  DAILY06


 PO


   10/10/19 06:00


    10/10/19 06:39





 


 Lisinopril


  (Prinivil)  40 mg  DAILY


 PO


   10/10/19 09:00


    10/10/19 09:21





 


 Meloxicam


  (Mobic)  7.5 mg  DAILY


 PO


   10/10/19 09:00


    10/10/19 09:21





 


 Enoxaparin Sodium


  (Lovenox 40mg


 Syringe)  40 mg  BID


 SQ


   10/9/19 17:00


    10/10/19 09:22





 


 Pantoprazole


 Sodium


  (Protonix)  40 mg  1X  ONCE


 PO


   10/10/19 09:00


 10/10/19 09:01 DC 10/10/19 09:20





 


 Multivitamins/


 Minerals


  (I-Markie)  1 tab  DAILY


 PO


   10/10/19 09:15


    10/10/19 09:20





 


 Ascorbic Acid


  (Vitamin C)  500 mg  DAILY


 PO


   10/10/19 09:15


    10/10/19 09:21




















TIMI ARIZMENDI K III DO           Oct 10, 2019 09:41

## 2019-10-11 NOTE — DS
DATE OF DISCHARGE:  10/10/2019



ADMISSION DIAGNOSIS:  Chest pain.



DISCHARGE DIAGNOSIS:  Resolving atypical chest pain.



HOSPITAL COURSE:  The patient is a pleasant 73-year-old female who presented

with chest pain.  We admitted the patient, did serial enzymes, serial EKGs, and

checked an echocardiogram.  We consulted Cardiology and did cardiac monitoring. 

Everything was negative.  Cardiology recommended an outpatient stress test.



DISPOSITION:  Home.



ACTIVITY:  As tolerated.



DIET:  Low sodium.



MEDICATIONS:  Please see the MRAD.  I told her to take some over-the-counter

Prilosec.



TOTAL TIME:  31 minutes.

 



______________________________

FREDRICKL LA ARIZMENDI DO



DR:  CECILY/chandan  JOB#:  [id number] / [id number]

DD:  10/11/2019 08:32  DT:  10/11/2019 08:51

## 2019-10-14 NOTE — CARD
MR#: [id number]

Account#: [id number]

Accession#: [id number]

Date of Study: 10/10/2019

Ordering Physician: MELISSA HAYES, 

Referring Physician: MELISSA HAYES, 

Tech: Pat Pierre





--------------- APPROVED REPORT --------------





EXAM: Two-dimensional and M-mode echocardiogram with Doppler and color Doppler.



Other Information 

Quality : FairHR: 77bpm



INDICATION

Chest Pain 

Congestive Heart Failure 



RISK FACTORS

Hypertension 

Hyperlipidemia



2D DIMENSIONS 

Left Atrium(2D)4.0 (1.6-4.0cm)IVSd1.3 (0.7-1.1cm)

LVDd5.2 (3.9-5.9cm)PWd1.1 (0.7-1.1cm)

LVDs3.6 (2.5-4.0cm)FS (%) 29.9 %

SV73.3 mlLVEF(%)56.7 (>50%)



Aortic Valve

AoV Peak Jonny.147.2cm/sAoV VTI29.5cm

AO Peak GR.8.7mmHgLVOT Peak Jonny.131.5cm/s

LVOT  VTI 28.60cmAO Mean GR.6mmHg



Mitral Valve

MV E Uscakegy80.5cm/sMV DECEL [id number]

MV A Wvtlvgpn559.4cm/sMV FUP15zg

E/A  Ratio0.8MVA (PHT)3.00cm2



TDI

E/Lateral E'11.3E/Medial E'11.4



Pulmonary Valve

PV Peak Luejtega24.0cm/sPV Peak Grad.4mmHg



Tricuspid Valve

TR P. Xuaurhsx425me/sRAP [id number]

TR Peak Gr.01zfHvEHWL46zfRp



Pulmonary Vein

S1 Bgsivtar77.6cm/sD2 Ibhhdvqy30.5cm/s

PVa cuxujkjd114wmys



 LEFT VENTRICLE 

The left ventricle is normal size. There is mild concentric left ventricular hypertrophy. The left ve
ntricular systolic function is normal. The Ejection Fraction is 50-55%. There is normal LV segmental 
wall motion. Transmitral Doppler flow pattern is Grade I-abnormal relaxation pattern.



 RIGHT VENTRICLE 

The right ventricle is borderline dilated. There is normal right ventricular wall thickness. The righ
t ventricular systolic function is normal.



 ATRIA 

The left atrium is mildly dilated. The right atrium size is normal. The interatrial septum is intact 
with no evidence for an atrial septal defect or patent foramen ovale as noted on 2-D or Doppler imagi
ng.



 AORTIC VALVE 

The aortic valve is calcified but opens well. Doppler and Color Flow revealed no significant aortic r
egurgitation. There is no significant aortic valvular stenosis.



 MITRAL VALVE 

Mitral annular calcification is mild to moderate. There is no evidence of mitral valve prolapse. Ther
e is no mitral valve stenosis. Doppler and Color-flow revealed trace mitral regurgitation.



 TRICUSPID VALVE 

The tricuspid valve is not well visualized. Doppler and Color Flow revealed trace tricuspid regurgita
tion with an estimated PAP of 29 mmHg. There is no tricuspid valve stenosis.



 PULMONIC VALVE 

The pulmonic valve is not well visualized. Doppler and Color Flow revealed no pulmonic valvular regur
gitation.



 GREAT VESSELS 

The aortic root is normal in size. The IVC is normal in size and collapses >50% with inspiration.



 PERICARDIAL EFFUSION 

There is no evidence of significant pericardial effusion.



Critical Notification

Critical Value: No



<Conclusion>

The left ventricular systolic function is normal.

The Ejection Fraction is 50-55%.

There is normal LV segmental wall motion.

Transmitral Doppler flow pattern is Grade I-abnormal relaxation pattern.

Trace mitral regurgitation.

Trace tricuspid regurgitation with an estimated PAP of 29 mmHg.

There is no evidence of significant pericardial effusion.



Signed by : Ramon Villagomez, 

Electronically Approved : 10/10/2019 13:11:55